# Patient Record
(demographics unavailable — no encounter records)

---

## 2024-11-06 NOTE — DISCUSSION/SUMMARY
[FreeTextEntry1] : This is an 83 year old man with a history of prostate CA, paroxysmal atrial fibrillation, not on AC for nosebleeds and falls, on amiodarone for rhythm control, coronary artery disease  status post a  drug-eluting stent to the obtuse marginal artery and angioplasty of the distal PDA in 2009, diabetes, hypertension and high cholesterol, more recently having developed orthostasis, chronic LE edema,  moderate carotid artery disease, who presents to the office for follow up.  He was admitted to  in September with AMS.  He was treated for PNA.  He was discharged to [facility], and is still there.  He is having increased LE edema.  He is not getting his Lasix, fludrocortisone, or amiodarone.    He has increased LE edema.  Lasix 20 QD needs to be resumed.  I advised elevation and compression.  They should also resume fludrocortisone 0.3 QD and amiodarone 100 QD.   He will continue Plavix.  He will continue the remainder of his medications, and return to the office in three months.  [EKG obtained to assist in diagnosis and management of assessed problem(s)] : EKG obtained to assist in diagnosis and management of assessed problem(s)

## 2024-11-06 NOTE — REASON FOR VISIT
[Follow-Up - Clinic] : a clinic follow-up of [Atrial Fibrillation] : atrial fibrillation [Coronary Artery Disease] : coronary artery disease [Dyspnea] : dyspnea [Hyperlipidemia] : hyperlipidemia [Hypertension] : hypertension

## 2024-11-06 NOTE — PHYSICAL EXAM
[General Appearance - Well Developed] : well developed [Normal Appearance] : normal appearance [Well Groomed] : well groomed [General Appearance - Well Nourished] : well nourished [No Deformities] : no deformities [General Appearance - In No Acute Distress] : no acute distress [Normal Conjunctiva] : the conjunctiva exhibited no abnormalities [Eyelids - No Xanthelasma] : the eyelids demonstrated no xanthelasmas [Normal Oral Mucosa] : normal oral mucosa [Normal Jugular Venous A Waves Present] : normal jugular venous A waves present [Normal Jugular Venous V Waves Present] : normal jugular venous V waves present [No Jugular Venous Rashid A Waves] : no jugular venous rashid A waves [] : no respiratory distress [Respiration, Rhythm And Depth] : normal respiratory rhythm and effort [Exaggerated Use Of Accessory Muscles For Inspiration] : no accessory muscle use [FreeTextEntry1] : Scattered rhonchi on the left side. [Bowel Sounds] : normal bowel sounds [Abdomen Soft] : soft [Abdomen Tenderness] : non-tender [Abnormal Walk] : normal gait [Gait - Sufficient For Exercise Testing] : the gait was sufficient for exercise testing [Nail Clubbing] : no clubbing of the fingernails [Cyanosis, Localized] : no localized cyanosis [Skin Color & Pigmentation] : normal skin color and pigmentation [Skin Turgor] : normal skin turgor [Oriented To Time, Place, And Person] : oriented to person, place, and time [Impaired Insight] : insight and judgment were intact [No Anxiety] : not feeling anxious [5th Left ICS - MCL] : palpated at the 5th LICS in the midclavicular line [Normal] : normal [No Precordial Heave] : no precordial heave was noted [Apical Thrill] : no thrill palpable at the apex [Normal Rate] : normal [Rhythm Regular] : regular [Normal S1] : normal S1 [Normal S2] : normal S2 [No Gallop] : no gallop heard [Click] : no click [Pericardial Rub] : no pericardial rub [II] : a grade 2 [2+] : left 2+ [1+] : left 1+ [___ +] : bilateral [unfilled]U+ pretibial pitting edema

## 2024-11-06 NOTE — CARDIOLOGY SUMMARY
[None] : normal LV function [Enlarged] : enlarged LA size [___] : [unfilled] [de-identified] : NSR.  First degree AVB

## 2024-11-06 NOTE — HISTORY OF PRESENT ILLNESS
[FreeTextEntry1] : This is an 83 year old man with a history of prostate CA, paroxysmal atrial fibrillation, not on AC for nosebleeds and falls, on amiodarone for rhythm control, coronary artery disease  status post a  drug-eluting stent to the obtuse marginal artery and angioplasty of the distal PDA in 2009, diabetes, hypertension and high cholesterol, more recently having developed orthostasis, chronic LE edema,  moderate carotid artery disease, who presents to the office for follow up.  He was admitted to  in September with AMS.  He was treated for PNA.  He was discharged to [facility], and is still there.  He is having increased LE edema.  He is not getting his Lasix, fludrocortisone, or amiodarone.  HE denies chest pain, dyspnea, PND, orthopnea, dizziness, or syncope. Echocardiogram performed in the hospital showed normal LV function with no significant valvular heart disease.  He is using midodrine only on an as needed basis as he had bradycardia on this drug.

## 2024-11-06 NOTE — REVIEW OF SYSTEMS
[Feeling Fatigued] : feeling fatigued [Joint Pain] : joint pain [Rash] : no rash [Dizziness] : no dizziness [Depression] : no depression [Anxiety] : no anxiety [Easy Bleeding] : no tendency for easy bleeding [Easy Bruising] : no tendency for easy bruising [Negative] : Genitourinary